# Patient Record
Sex: MALE | Race: WHITE | Employment: FULL TIME | URBAN - METROPOLITAN AREA
[De-identification: names, ages, dates, MRNs, and addresses within clinical notes are randomized per-mention and may not be internally consistent; named-entity substitution may affect disease eponyms.]

---

## 2017-09-27 ENCOUNTER — HOSPITAL ENCOUNTER (OUTPATIENT)
Dept: PHYSICAL THERAPY | Age: 47
Discharge: HOME | End: 2017-09-27
Payer: COMMERCIAL

## 2017-09-27 PROCEDURE — 97162 PT EVAL MOD COMPLEX 30 MIN: CPT

## 2017-09-27 PROCEDURE — 97110 THERAPEUTIC EXERCISES: CPT

## 2017-09-27 NOTE — PROGRESS NOTES
[facility] PHYSICAL THERAPY AT [facility] [address] [place], [address]  Phone: [phone]  Fax: [phone] / [phone] [facility]  Patient Name: Kayode Purcell : 1970   Medical   Diagnosis: Right shoulder pain [M25.511]  Right knee pain [M25.561] Treatment Diagnosis: R knee and shoulder pain   Onset Date: 2 years/May 2017     Referral Source: Dragan Robertson MD Start of Care [facility]): 2017   Prior Hospitalization: See medical history Provider #: [id number]   Prior Level of Function: Limited overhead use   Comorbidities: Partially torn ligament in left elbow, CA- Hodgkin's Lymphoma,    Medications: Verified on Patient Summary List   The Plan of Care and following information is based on the information from the initial evaluation.   ==================================================================================  Assessment / key information:  Patient is a 52 y.o.  yo male with Dx of Left shoulder pain [M25.512]  Left knee pain [M25.562]. Patient plays beach volleyball 4-5 times a week. Currently has only played once since the beginning of August.  Reports two year h/o R shoulder pain with overhead hit in volley ball with arm in abd/ER forward momentum. Reports pain in R knee in patella tendon with jumping and increased \"tightness\" following playing and prolonged sitting. Knee pain began in May 2017. Difficulty navigating stairs, pain with ascending and descending. He currently rates his pain as 8-9/10 at worst shoulder (knee 8/10 at worst), 0/10 at best,(5/10 R knee at best) primarily located at patella tendon and posterior shoulder/scapula R shoulder. Objective Findings:  Shoulder AROM: WFL all planes with painful arc in abduction and pain at ER , MMT:3-/5 scapular stabilizers, rhomboids, 3/-5 ER/IR 4-/5 flx, ext and abd.   Special Test: Neer's and Burlene Fake Impingement Test: (+) , Empty Can Test: (-)  , Speeds Test: (-).   R knee ROM WFL, significantly limited hamstring flexibility: 90/90 at 45 degs , 3/5 strength in hip extensors and rotators, decreased SLS <5 seconds on R, pain with squat test, +TTP ischial tuberosity and patella tendon. Patient is presenting with signs of R RTC impingement and patella femoral S&S in the R knee Patient can benefit from PT interventions to improve Rom, strength, flexibilty, balance, decrease pain, to facilitate ADLs & overall functional status.   ==================================================================================  Eval Complexity: History MEDIUM  Complexity : 1-2 comorbidities / personal factors will impact the outcome/ POC ;  Examination  HIGH Complexity : 4+ Standardized tests and measures addressing body structure, function, activity limitation and / or participation in recreation ; Presentation MEDIUM Complexity : Evolving with changing characteristics ; Decision Making MEDIUM Complexity : FOTO score of 26-74; Overall Complexity MEDIUM  Problem List: pain affecting function, decrease ROM, decrease strength, impaired gait/ balance, decrease ADL/ functional abilitiies, decrease activity tolerance, decrease flexibility/ joint mobility and other FOTO 54                Treatment Plan may include any combination of the following: Therapeutic exercise, Therapeutic activities, Neuromuscular re-education, Physical agent/modality, Gait/balance training, Manual therapy and Patient education  Patient / Family readiness to learn indicated by: asking questions, trying to perform skills and interest  Persons(s) to be included in education: patient (P)  Barriers to Learning/Limitations: None  Measures taken:     Patient Goal (s): \"avoid surgery\"   Patient self reported health status: good  Rehabilitation Potential: good  · Short Term Goals:  To be accomplished in  2-3  weeks:  1) Patient to be independent and compliant with initial HEP to prevent further disability. 2) Patient will report decreased c/o pain to < or = 3/10 in shoulder and knee to facilitate improved tolerance to ADL's with manageable sx in the right knee and shoulder. 3) Patient will increase scapular strength by 1/2 grade and demonstrate improved isolation of movement to allow for improved mechanics at the shoulder  · Long Term Goals: To be accomplished in  4-6  weeks:  1) Patient to be independent & compliant with progressive HEP in preparation for D/C.  2) Patient will demonstrate 4+/5 strength in R shoulder/scapula to allow for overhead use and return to sport without increased pain  3.) Patient will improve FOTO score to>/= to 60 indicating improved functional mobility and Wilson with ADL's. Frequency / Duration:                 Patient to be seen  2-3  times per week for 4-6  weeks:  Patient / Caregiver education and instruction: self care, activity modification and exercises  G-Codes (GP): N/A  Therapist Signature: Swati Pickens PT Date: 6/10/3176   Certification Period: N/A Time: 8:24 AM   ===========================================================================================  I certify that the above Physical Therapy Services are being furnished while the patient is under my care. I agree with the treatment plan and certify that this therapy is necessary.     Physician Signature:                                                                                                 Date:                                                                  Time:                                                               Please sign and return to [facility] at [facility] or you may fax the signed copy to [phone]. Thank you.

## 2017-09-27 NOTE — PROGRESS NOTES
PHYSICAL THERAPY - DAILY TREATMENT NOTE    Patient Name: Sharon Glover        Date: 2017  : 1970   YES Patient  Verified  Visit #:     Insurance: Payor: Fideliamarinaenzo Johnna / Plan: Marcosajsjim 97 / Product Type: TERRIE /      In time: 193 Out time: 930   Total Treatment Time: 60     Medicare Time Tracking (below)   Total Timed Codes (min):   1:1 Treatment Time:       TREATMENT AREA =  Left shoulder pain [M25.512]  Left knee pain [M25.562]  SUBJECTIVE  Pain Level (on 0 to 10 scale):  5  / 10   Medication Changes/New allergies or changes in medical history, any new surgeries or procedures?     NO    If yes, update Summary List   Subjective Functional Status/Changes:  []  No changes reported     See POC          OBJECTIVE  Modalities Rationale:     decrease inflammation and decrease pain to improve patient's ability to perform ADL's   min [] Estim, type/location:                                      []  att     []  unatt     []  w/US     []  w/ice    []  w/heat    min []  Mechanical Traction: type/lbs                   []  pro   []  sup   []  int   []  cont    []  before manual    []  after manual    min []  Ultrasound, settings/location:      min []  Iontophoresis w/ dexamethasone, location:                                               []  take home patch       []  in clinic   10 min [x]  Ice     []  Heat    location/position: Supine to R knee and shoulder    min []  Vasopneumatic Device, press/temp:     min []  Other:    [] Skin assessment post-treatment (if applicable):    []  intact    []  redness- no adverse reaction     []redness - adverse reaction:        20 min Therapeutic Exercise:  [x]  See flow sheet   Rationale:       increase ROM and increase strength to improve the patients ability to perform ADL's      min Manual Therapy:         min Therapeutic Activity:    Rationale:        min Neuromuscular Re-ed:    Rationale:       min Gait Training:    Rationale:       min Patient Education: YES  Reviewed HEP   [x]  Progressed/Changed HEP based on:   Issued written HEP and reviewed     Other Objective/Functional Measures:    See POC  TE per FS     Post Treatment Pain Level (on 0 to 10) scale:   2  / 10     ASSESSMENT  Assessment/Changes in Function:     See POC     []  See Progress Note/Recertification   Patient will continue to benefit from skilled PT services to modify and progress therapeutic interventions, address functional mobility deficits, address ROM deficits, address strength deficits, analyze and address soft tissue restrictions, analyze and cue movement patterns, analyze and modify body mechanics/ergonomics and assess and modify postural abnormalities to attain remaining goals.    Progress toward goals / Updated goals:    See POC     PLAN  [x]  Upgrade activities as tolerated YES Continue plan of care   []  Discharge due to :    []  Other:      Therapist: Carlyle Hector PT    Date: 9/27/2017 Time: 8:24 AM     Future Appointments  Date Time Provider Stoney Sandhu   9/27/2017 8:30 AM Carlyle Hector, PT REHAB CENTER AT [facility]

## 2017-09-27 NOTE — PROGRESS NOTES
[address]   PHYSICAL THERAPY AT [facility] [address] [place], [address]  Phone: [phone]  Fax: [phone] / [address] [facility]  Patient Name: Marc Brock : 1970   Medical   Diagnosis: Left shoulder pain [M25.512]  Left knee pain [M25.562] Treatment Diagnosis: Left shoulder and Left knee pain   Onset Date: 2 years/May 2017     Referral Source: Unknown, Provider, MD Start of Care [facility]): 2017   Prior Hospitalization: See medical history Provider #: [id number]   Prior Level of Function: Limited overhead use   Comorbidities: Partially torn ligament in left elbow, CA- Hodgkin's Lymphoma,    Medications: Verified on Patient Summary List   The Plan of Care and following information is based on the information from the initial evaluation.   ==================================================================================  Assessment / key information:  Patient is a 52 y.o.  yo male with Dx of Left shoulder pain [M25.512]  Left knee pain [M25.562]. Patient plays beach volleyball 4-5 times a week. Currently has only played once since the beginning of August.  Reports two year h/o R shoulder pain with overhead hit in volley ball with arm in abd/ER forward momentum. Reports pain in R knee in patella tendon with jumping and increased \"tightness\" following playing and prolonged sitting. Knee pain began in May 2017. Difficulty navigating stairs, pain with ascending and descending. He currently rates his pain as 8-9/10 at worst shoulder (knee 8/10 at worst), 0/10 at best,(5/10 R knee at best) primarily located at patella tendon and posterior shoulder/scapula R shoulder. Objective Findings:  Shoulder AROM: WFL all planes with painful arc in abduction and pain at ER , MMT:3-/5 scapular stabilizers, rhomboids, 3/-5 ER/IR 4-/5 flx, ext and abd.   Special Test: Neer's and Mya Miguel Impingement Test: (+) , Empty Can Test: (-)  , Speeds Test: (-).   R knee ROM WFL, significantly limited hamstring flexibility: 90/90 at 45 degs , 3/5 strength in hip extensors and rotators, decreased SLS <5 seconds on R, pain with squat test, +TTP ischial tuberosity and patella tendon. Patient is presenting with signs of R RTC impingement and patella femoral S&S in the R knee Patient can benefit from PT interventions to improve Rom, strength, flexibilty, balance, decrease pain, to facilitate ADLs & overall functional status.   ==================================================================================  Eval Complexity: History MEDIUM  Complexity : 1-2 comorbidities / personal factors will impact the outcome/ POC ;  Examination  HIGH Complexity : 4+ Standardized tests and measures addressing body structure, function, activity limitation and / or participation in recreation ; Presentation MEDIUM Complexity : Evolving with changing characteristics ; Decision Making MEDIUM Complexity : FOTO score of 26-74; Overall Complexity MEDIUM  Problem List: pain affecting function, decrease ROM, decrease strength, impaired gait/ balance, decrease ADL/ functional abilitiies, decrease activity tolerance, decrease flexibility/ joint mobility and other FOTO 54   Treatment Plan may include any combination of the following: Therapeutic exercise, Therapeutic activities, Neuromuscular re-education, Physical agent/modality, Gait/balance training, Manual therapy and Patient education  Patient / Family readiness to learn indicated by: asking questions, trying to perform skills and interest  Persons(s) to be included in education: patient (P)  Barriers to Learning/Limitations: None  Measures taken:    Patient Goal (s): \"avoid surgery\"   Patient self reported health status: good  Rehabilitation Potential: good   Short Term Goals:  To be accomplished in  2-3  weeks:  1) Patient to be independent and compliant with initial HEP to prevent further disability. 2) Patient will report decreased c/o pain to < or = 3/10 in shoulder and knee to facilitate improved tolerance to ADL's with manageable sx in the right knee and shoulder. 3) Patient will increase scapular strength by 1/2 grade and demonstrate improved isolation of movement to allow for improved mechanics at the shoulder   Long Term Goals: To be accomplished in  4-6  weeks:  1) Patient to be independent & compliant with progressive HEP in preparation for D/C.  2) Patient will demonstrate 4+/5 strength in R shoulder/scapula to allow for overhead use and return to sport without increased pain  3.) Patient will improve FOTO score to>/= to 60 indicating improved functional mobility and Aubrey with ADL's. Frequency / Duration:   Patient to be seen  2-3  times per week for 4-6  weeks:  Patient / Caregiver education and instruction: self care, activity modification and exercises  G-Codes (GP): N/A  Therapist Signature: Franky Pena PT Date: 5/23/7311   Certification Period: N/A Time: 8:24 AM   ===========================================================================================  I certify that the above Physical Therapy Services are being furnished while the patient is under my care. I agree with the treatment plan and certify that this therapy is necessary. Physician Signature:        Date:       Time:     Please sign and return to [facility] at [facility] or you may fax the signed copy to [phone]. Thank you.

## 2017-09-29 ENCOUNTER — APPOINTMENT (OUTPATIENT)
Dept: PHYSICAL THERAPY | Age: 47
End: 2017-09-29
Payer: COMMERCIAL

## 2017-10-03 ENCOUNTER — APPOINTMENT (OUTPATIENT)
Dept: PHYSICAL THERAPY | Age: 47
End: 2017-10-03
Payer: COMMERCIAL

## 2017-10-05 ENCOUNTER — APPOINTMENT (OUTPATIENT)
Dept: PHYSICAL THERAPY | Age: 47
End: 2017-10-05
Payer: COMMERCIAL

## 2017-10-10 ENCOUNTER — HOSPITAL ENCOUNTER (OUTPATIENT)
Dept: PHYSICAL THERAPY | Age: 47
End: 2017-10-10
Payer: COMMERCIAL

## 2017-10-12 ENCOUNTER — HOSPITAL ENCOUNTER (OUTPATIENT)
Dept: PHYSICAL THERAPY | Age: 47
Discharge: HOME | End: 2017-10-12
Payer: COMMERCIAL

## 2017-10-12 PROCEDURE — 97110 THERAPEUTIC EXERCISES: CPT

## 2017-10-12 PROCEDURE — 97140 MANUAL THERAPY 1/> REGIONS: CPT

## 2017-10-12 PROCEDURE — 97035 APP MDLTY 1+ULTRASOUND EA 15: CPT

## 2017-10-12 NOTE — PROGRESS NOTES
PHYSICAL THERAPY - DAILY TREATMENT NOTE      Patient Name: Yady Rojo        Date: 10/12/2017  : 1970   YES Patient  Verified  Visit #:     Insurance: Payor: Lore Rogers / Plan: Pranay Gonzalez / Product Type: TERRIE /      In time: 900 Out time: 1005   Total Treatment Time: 65     Medicare Time Tracking (below)   Total Timed Codes (min):   1:1 Treatment Time:       TREATMENT AREA =  Right shoulder pain [M25.511]  Right knee pain [M25.561]    SUBJECTIVE    Pain Level (on 0 to 10 scale):  1-2  / 10   Medication Changes/New allergies or changes in medical history, any new surgeries or procedures?     NO    If yes, update Summary List   Subjective Functional Status/Changes:  []  No changes reported     Reports con't soreness in (R) shld and (R) knee region at onset of visit      OBJECTIVE  Modalities Rationale:     decrease inflammation and decrease pain to improve patient's ability to perform ADLs and leisure activities       min [] Estim, type/location:                                      []  att     []  unatt     []  w/US     []  w/ice    []  w/heat    min []  Mechanical Traction: type/lbs                   []  pro   []  sup   []  int   []  cont    []  before manual    []  after manual   8 min [x]  Ultrasound, settings/location:  3.3Mhz 20% to (R) tibial tuberosity    min []  Iontophoresis w/ dexamethasone, location:                                               []  take home patch       []  in clinic    min []  Ice     []  Heat    location/position:     min []  Vasopneumatic Device, press/temp:     min []  Other:    [x] Skin assessment post-treatment (if applicable):    [x]  intact    [x]   no adverse reaction     []redness - adverse reaction:      40 min Therapeutic Exercise:  [x]  See flow sheet   Rationale:      increase ROM and increase strength to improve the patients ability to perform ADLs and leisure activities      17 min Manual Therapy: Technique:      [x] S/DTM []IASTM []PROM [] Passive Stretching   [x]manual TPR    []Jt manipulation:Gr I [] II []  III [] IV[] V[]  Treatment Area:  (R) post cuff, pec minor, subscap, PROM of (R)    Rationale:      decrease pain, increase ROM, increase tissue extensibility and decrease trigger points to improve patient's ability to perform ADLs     throughout therapy min Patient Education:  YES  Reviewed HEP   []  Progressed/Changed HEP based on: Other Objective/Functional Measures:    Demonstrates increase in hypertonicity with TPs in (R) post cuff, pec minor and subscap region     Initiated therx per flow sheet, trial of counter force brace of (R) knee s/s   Post Treatment Pain Level (on 0 to 10) scale:   0  / 10     ASSESSMENT    Assessment/Changes in Function:     Progressing with overall function, tolerated therx well without increase in s/s     []  See Progress Note/Recertification   Patient will continue to benefit from skilled PT services to modify and progress therapeutic interventions, address functional mobility deficits, address ROM deficits, address strength deficits, analyze and address soft tissue restrictions, analyze and cue movement patterns and analyze and modify body mechanics/ergonomics to attain remaining goals.       Progress toward goals / Updated goals:    Monitor for change in s/s with use of counter force brace next visit      PLAN    []  Upgrade activities as tolerated YES Continue plan of care   []  Discharge due to :    []  Other:      Therapist: Kindra Orona PT    Date: 10/12/2017 Time: 10:34 AM   Future Appointments  Date Time Provider Stoney Sandhu   10/17/2017 9:00 AM Kindra Orona PT REHAB CENTER AT [facility]   10/19/2017 9:00 AM Kindra Orona PT REHAB CENTER AT [facility]   10/24/2017 9:00 AM Kindra Orona PT REHAB CENTER AT [facility]   10/26/2017 9:00 AM Kindra Orona PT REHAB CENTER AT [facility]   10/31/2017 9:00 AM Kindra Orona PT REHAB CENTER AT [facility]

## 2017-10-17 ENCOUNTER — HOSPITAL ENCOUNTER (OUTPATIENT)
Dept: PHYSICAL THERAPY | Age: 47
Discharge: HOME | End: 2017-10-17
Payer: COMMERCIAL

## 2017-10-17 PROCEDURE — 97035 APP MDLTY 1+ULTRASOUND EA 15: CPT

## 2017-10-17 PROCEDURE — 97110 THERAPEUTIC EXERCISES: CPT

## 2017-10-17 PROCEDURE — 97140 MANUAL THERAPY 1/> REGIONS: CPT

## 2017-10-17 NOTE — PROGRESS NOTES
PHYSICAL THERAPY - DAILY TREATMENT NOTE      Patient Name: Og Oropeza        Date: 10/17/2017  : 1970   YES Patient  Verified  Visit #:   3   of   12  Insurance: Payor: Nicole Aponte / Plan: Giojsjim 97 / Product Type: TERRIE /      In time: 900 Out time: 1005   Total Treatment Time: 65     Medicare Time Tracking (below)   Total Timed Codes (min):   1:1 Treatment Time:       TREATMENT AREA =  Right shoulder pain [M25.511]  Right knee pain [M25.561]    SUBJECTIVE    Pain Level (on 0 to 10 scale):  2  / 10   Medication Changes/New allergies or changes in medical history, any new surgeries or procedures?     NO    If yes, update Summary List   Subjective Functional Status/Changes:  []  No changes reported     Reports soreness in (R) shld region, no pain in (R) knee, states counter-force brace decrease pain with simple ADLs     OBJECTIVE  Modalities Rationale:     decrease inflammation and decrease pain to improve patient's ability to increase ADL tolerance       min [] Estim, type/location:                                      []  att     []  unatt     []  w/US     []  w/ice    []  w/heat    min []  Mechanical Traction: type/lbs                   []  pro   []  sup   []  int   []  cont    []  before manual    []  after manual   8 min [x]  Ultrasound, settings/location:  3.3 Mhz 20% to (R) tibial tuberosity     min []  Iontophoresis w/ dexamethasone, location:                                               []  take home patch       []  in clinic    min []  Ice     []  Heat    location/position:     min []  Vasopneumatic Device, press/temp:     min []  Other:    [x] Skin assessment post-treatment (if applicable):    [x]  intact    [x]  redness- no adverse reaction     []redness - adverse reaction:      47 min Therapeutic Exercise:  [x]  See flow sheet   Rationale:      increase ROM and increase strength to improve the patients ability to perform ADLs      10 min Manual Therapy: Technique:      [x] S/DTM []IASTM []PROM [x] Passive Stretching   [x]manual TPR    []Jt manipulation:Gr I [] II []  III [] IV[] V[]  Treatment Area:  (R) post cuff, pec minor, subscap, PROM of (R) shld in all planes    Rationale:      decrease pain, increase ROM, increase tissue extensibility and decrease trigger points to improve patient's ability to increase functional use of (R) UE      throughout therapy min Patient Education:  YES  Reviewed HEP   []  Progressed/Changed HEP based on: Other Objective/Functional Measures:    Demonstrates increase in (R) post cuff and anterior shld at onset of visit, decrease in ER at 90/90 at onset of visit. Post Treatment Pain Level (on 0 to 10) scale:   0  / 10     ASSESSMENT    Assessment/Changes in Function:     Progressing with overall function and ADL tolerance, advised in use of counter-force brace for sport related events      []  See Progress Note/Recertification   Patient will continue to benefit from skilled PT services to modify and progress therapeutic interventions, address functional mobility deficits, address ROM deficits, address strength deficits and analyze and address soft tissue restrictions to attain remaining goals.       Progress toward goals / Updated goals:    Progressing with towards goals      PLAN    []  Upgrade activities as tolerated YES Continue plan of care   []  Discharge due to :    []  Other:      Therapist: Amada Nickerson PT    Date: 10/17/2017 Time: 9:30 AM   Future Appointments  Date Time Provider Stoney Sandhu   10/19/2017 9:00 AM Amada Nickerson PT REHAB CENTER AT [facility]   10/24/2017 9:00 AM Amada Nickerson PT REHAB CENTER AT [facility]   10/26/2017 9:00 AM Amada Nickerson PT REHAB CENTER AT [facility]   10/31/2017 9:00 AM Amada Nickerson, PT REHAB CENTER AT [facility]

## 2017-10-19 ENCOUNTER — HOSPITAL ENCOUNTER (OUTPATIENT)
Dept: PHYSICAL THERAPY | Age: 47
Discharge: HOME | End: 2017-10-19
Payer: COMMERCIAL

## 2017-10-19 PROCEDURE — 97035 APP MDLTY 1+ULTRASOUND EA 15: CPT

## 2017-10-19 PROCEDURE — 97110 THERAPEUTIC EXERCISES: CPT

## 2017-10-19 PROCEDURE — 97140 MANUAL THERAPY 1/> REGIONS: CPT

## 2017-10-19 NOTE — PROGRESS NOTES
PHYSICAL THERAPY - DAILY TREATMENT NOTE      Patient Name: Avery Ayala        Date: 10/19/2017  : 1970   YES Patient  Verified  Visit #:     Insurance: Payor: Delena Boeck / Plan: Johan 97 / Product Type: TERRIE /      In time: 047 Out time: 1000   Total Treatment Time: 65     Medicare Time Tracking (below)   Total Timed Codes (min):   1:1 Treatment Time:       TREATMENT AREA =  Right shoulder pain [M25.511]  Right knee pain [M25.561]    SUBJECTIVE    Pain Level (on 0 to 10 scale):  1  / 10   Medication Changes/New allergies or changes in medical history, any new surgeries or procedures?     NO    If yes, update Summary List   Subjective Functional Status/Changes:  []  No changes reported     Reports no increase in s/s since last session, (R) shld feels less tight, no knee pain     OBJECTIVE  Modalities Rationale:     decrease inflammation and decrease pain to improve patient's ability to perform ADLs      min [] Estim, type/location:                                      []  att     []  unatt     []  w/US     []  w/ice    []  w/heat    min []  Mechanical Traction: type/lbs                   []  pro   []  sup   []  int   []  cont    []  before manual    []  after manual   8 min [x]  Ultrasound, settings/location:  3.3 Mhz, 20%, .8 w/cm2 to (R) tibial tuberosity     min []  Iontophoresis w/ dexamethasone, location:                                               []  take home patch       []  in clinic    min []  Ice     []  Heat    location/position:     min []  Vasopneumatic Device, press/temp:     min []  Other:    [x] Skin assessment post-treatment (if applicable):    [x]  intact    [x]  redness- no adverse reaction     []redness - adverse reaction:      47 min Therapeutic Exercise:  [x]  See flow sheet   Rationale:      increase ROM and increase strength to improve the patients ability to perform ADLs     10 min Manual Therapy: Technique:      [x] S/DTM []IASTM []PROM [] Passive Stretching   [x]manual TPR    []Jt manipulation:Gr I [] II []  III [x] IV[] V[]  Treatment Area:  DTm, TPr to (R) pec minor, subscap, post cuff, PROM of (R) shld in all planes, scap mobs for medial glide, posterior glide    Rationale:      decrease pain, increase ROM, increase tissue extensibility and decrease trigger points to improve patient's ability to increase overhead use of (R) UE during sports activities       throughout therapy min Patient Education:  YES  Reviewed HEP   []  Progressed/Changed HEP based on: Other Objective/Functional Measures:    Demonstrates soreness in post cuff mm at onset of visit with limited overhead function      Post Treatment Pain Level (on 0 to 10) scale:   0  / 10     ASSESSMENT    Assessment/Changes in Function:     Progressing with overall function and ADL tolerance, con't with mild pain during leisure activities      []  See Progress Note/Recertification   Patient will continue to benefit from skilled PT services to modify and progress therapeutic interventions, address functional mobility deficits, address ROM deficits, address strength deficits, analyze and address soft tissue restrictions and analyze and cue movement patterns to attain remaining goals.       Progress toward goals / Updated goals:    Progressing with STG, will reassess functional scales in near future     PLAN    []  Upgrade activities as tolerated YES Continue plan of care   []  Discharge due to :    []  Other:      Therapist: Chance De Oliveira PT    Date: 10/19/2017 Time: 9:43 AM   Future Appointments  Date Time Provider Stoney Sandhu   10/24/2017 9:00 AM Chance De Oliveira PT [facility]   10/26/2017 9:00 AM Chance De Oliveira PT [facility]   10/31/2017 9:00 AM Chance De Oliveira PT [facility]

## 2017-10-24 ENCOUNTER — HOSPITAL ENCOUNTER (OUTPATIENT)
Dept: PHYSICAL THERAPY | Age: 47
Discharge: HOME | End: 2017-10-24
Payer: COMMERCIAL

## 2017-10-24 PROCEDURE — 97110 THERAPEUTIC EXERCISES: CPT

## 2017-10-24 PROCEDURE — 97035 APP MDLTY 1+ULTRASOUND EA 15: CPT

## 2017-10-24 PROCEDURE — 97140 MANUAL THERAPY 1/> REGIONS: CPT

## 2017-10-26 ENCOUNTER — HOSPITAL ENCOUNTER (OUTPATIENT)
Dept: PHYSICAL THERAPY | Age: 47
Discharge: HOME | End: 2017-10-26
Payer: COMMERCIAL

## 2017-10-26 PROCEDURE — 97110 THERAPEUTIC EXERCISES: CPT

## 2017-10-26 PROCEDURE — 97140 MANUAL THERAPY 1/> REGIONS: CPT

## 2017-10-26 PROCEDURE — 97035 APP MDLTY 1+ULTRASOUND EA 15: CPT

## 2017-10-26 NOTE — PROGRESS NOTES
PHYSICAL THERAPY - DAILY TREATMENT NOTE      Patient Name: Taylor Care        Date: 10/26/2017  : 1970   YES Patient  Verified  Visit #:     Insurance: Payor: Maribel Barba / Plan: July Beckford / Product Type: TERRIE /      In time: 900 Out time: 1005   Total Treatment Time: 65     Medicare Time Tracking (below)   Total Timed Codes (min):   1:1 Treatment Time:       TREATMENT AREA =  Right shoulder pain [M25.511]  Right knee pain [M25.561]    SUBJECTIVE    Pain Level (on 0 to 10 scale):  2  / 10   Medication Changes/New allergies or changes in medical history, any new surgeries or procedures? NO    If yes, update Summary List   Subjective Functional Status/Changes:  []  No changes reported     Reports ongoing soreness in posterior cuff and (R) knee with physical actiivties      OBJECTIVE  Modalities Rationale:     decrease inflammation and decrease pain to improve patient's ability to perform ADLs      min [] Estim, type/location:                                      []  att     []  unatt     []  w/US     []  w/ice    []  w/heat    min []  Mechanical Traction: type/lbs                   []  pro   []  sup   []  int   []  cont    []  before manual    []  after manual   8 min [x]  Ultrasound, settings/location:  3.3Mhz 20% . 5w/cm2    min []  Iontophoresis w/ dexamethasone, location:                                               []  take home patch       []  in clinic    min []  Ice     []  Heat    location/position:     min []  Vasopneumatic Device, press/temp:     min []  Other:    [x] Skin assessment post-treatment (if applicable):    [x]  intact    [x]  no adverse reaction     []redness - adverse reaction:      47 min Therapeutic Exercise:  [x]  See flow sheet   Rationale:      increase ROM and increase strength to improve the patients ability to perform ADLs     10 min Manual Therapy: Technique:      [x] S/DTM []IASTM []PROM [] Passive Stretching   [x]manual TPR    [x]Jt manipulation:Gr I [] II []  III [] IV[] V[]  Treatment Area: DTM to (R) post cuff, pec minor, PROM for IR/ER    Rationale:      decrease pain, increase ROM and increase tissue extensibility to improve patient's ability to perform ADLs      throughout therapy min Patient Education:  YES  Reviewed HEP   []  Progressed/Changed HEP based on: Other Objective/Functional Measures:    Demonstrates hypertonicity in (R) post cuff    ER PROM at 90/90: 95 deg, ABD: 165 deg     Added post cuff stretch at conclusion of visit today   Post Treatment Pain Level (on 0 to 10) scale:   0  / 10     ASSESSMENT    Assessment/Changes in Function:     Progressing with pain levels and mobility during ADL and work related postures, cont with pain in shld and (R) knee with physical activities      []  See Progress Note/Recertification   Patient will continue to benefit from skilled PT services to modify and progress therapeutic interventions, address functional mobility deficits, address ROM deficits, address strength deficits, analyze and address soft tissue restrictions and analyze and cue movement patterns to attain remaining goals.       Progress toward goals / Updated goals:    Pt to MD with status report will await results of MD follow up     PLAN    []  Upgrade activities as tolerated YES Continue plan of care   []  Discharge due to :    []  Other:      Therapist: Avelino Reeves PT    Date: 10/26/2017 Time: 9:46 AM   Future Appointments  Date Time Provider Stoney Sandhu   10/31/2017 9:00 AM Avelino Reeves PT [facility] AT [facility]

## 2017-11-14 NOTE — PROGRESS NOTES
[facility] PHYSICAL THERAPY AT [facility] [address] - Phone: [phone]  Fax: [phone]  PROGRESS NOTE  Patient Name: Brionna Dorado : 1970   Treatment/Medical Diagnosis: Right shoulder pain [M25.511]  Right knee pain [M25.561]   Referral Source: Gayatri Mueller MD     Date of Initial Visit: 17 Attended Visits: 6 Missed Visits:      SUMMARY OF TREATMENT  Therapeutic ex including strengthening, ROM, flexibility, stabilization, manual therapy including: Patient education, HEP  CURRENT STATUS  Pt is making steady progress in therapy. Pain is rated as 0-2/10, demonstrates PROM/AROM (R) shld AROM Flex: 165 deg, ER: at 90 deg Abd 80 deg, Abd: 160 deg. Con't to demonstrates increase in post cuff hypertonicity with TPs and pain with sports related activities. (R) knee AROM 0-135 deg with decrease in pain with use of patellar tendon counter-force bracing and phonophoresis. Goal/Measure of Progress Goal Met?   1.  1) Patient to be independent and compliant with initial HEP to prevent further disability. Status at last Eval: no Current Status: independent yes   2.  2) Patient will report decreased c/o pain to < or = 3/10 in shoulder and knee to facilitate improved tolerance to ADL's with manageable sx in the right knee and shoulder. Status at last Eval: 8/10 Current Status: 2/10 yes   3.  3) Patient will increase scapular strength by 1/2 grade and demonstrate improved isolation of movement to allow for improved mechanics at the shoulder   Status at last Eval: Decreased 3-/5 Current Status: Decreased 3/5 yes       RECOMMENDATIONS  Will DC at this time per patient request. Spoke with Mr. Teddy Hilario 17 stating that he is scheduling surgery with your office and would like to DC PT at this time. Thank you    If you have any questions/comments please contact us directly at [phone].    Thank you for allowing us to assist in the care of your patient.     Therapist Signature: Mele Dominguez DPT, CIMT Date: 11/14/2017     Time: 3:48 PM